# Patient Record
Sex: FEMALE | Race: BLACK OR AFRICAN AMERICAN
[De-identification: names, ages, dates, MRNs, and addresses within clinical notes are randomized per-mention and may not be internally consistent; named-entity substitution may affect disease eponyms.]

---

## 2017-04-26 ENCOUNTER — HOSPITAL ENCOUNTER (OUTPATIENT)
Age: 27
End: 2017-04-26
Payer: COMMERCIAL

## 2017-04-26 DIAGNOSIS — Z83.2: ICD-10-CM

## 2017-04-26 DIAGNOSIS — M79.609: Primary | ICD-10-CM

## 2017-04-26 DIAGNOSIS — E55.9: ICD-10-CM

## 2017-11-14 ENCOUNTER — HOSPITAL ENCOUNTER (EMERGENCY)
Dept: HOSPITAL 76 - ED | Age: 27
Discharge: HOME | End: 2017-11-14
Payer: MEDICAID

## 2017-11-14 VITALS — DIASTOLIC BLOOD PRESSURE: 79 MMHG | SYSTOLIC BLOOD PRESSURE: 118 MMHG

## 2017-11-14 DIAGNOSIS — J45.901: Primary | ICD-10-CM

## 2017-11-14 PROCEDURE — 71020: CPT

## 2017-11-14 PROCEDURE — 94664 DEMO&/EVAL PT USE INHALER: CPT

## 2017-11-14 PROCEDURE — 99283 EMERGENCY DEPT VISIT LOW MDM: CPT

## 2017-11-14 NOTE — XRAY PRELIMINARY REPORT
Accession: [id number]

Exam: XR CHEST 2 VIEW PA/LAT

 

IMPRESSION: Normal 2-view chest radiography.

 

[facility]

 

SITE ID: [id number]

## 2017-11-14 NOTE — ED PHYSICIAN DOCUMENTATION
History of Present Illness





- Stated complaint


Stated Complaint: COUGHING





- Chief complaint


Chief Complaint: Abd Pain





- Additonal information


Additional information: 





hx from pt


27 female


denies preg


to ER with cough X 2 months


was txed for sinusitis s relief


no travle


kids int sick too


no leg pain or swelling


tried mucinex


was coughing so hard last night it made her vomit and her abd is sore today 

from coughing and she coughed up a little bit of bloody mucous





Review of Systems


Constitutional: denies: Fever, Chills


Nose: reports: Congestion


Throat: denies: Sore throat


Cardiac: denies: Chest pain / pressure


Respiratory: reports: Cough





PD PAST MEDICAL HISTORY





- Past Medical History


Past Medical History: No





- Past Surgical History


Past Surgical History: Yes


/GYN:  section





- Present Medications


Home Medications: 


 Ambulatory Orders











 Medication  Instructions  Recorded  Confirmed


 


Albuterol Sulfate [Proair Hfa 2 puffs INH Q4H PRN #1 inhaler 17 





Inhaler]   


 


Benzonatate [Tessalon] 100 mg PO TID PRN #20 capsule 17 


 


Fluticasone [Flonase] 1 sprays BROOKS BID PRN #1 bottle 17 


 


Pseudoephedrine [Sudafed] 30 mg PO Q6H PRN #20 tablet 17 


 


predniSONE [Deltasone] 60 mg PO DAILY 5 Days  tablet 17 














- Allergies


Allergies/Adverse Reactions: 


 Allergies











Allergy/AdvReac Type Severity Reaction Status Date / Time


 


No Known Drug Allergies Allergy   Verified 17 11:11














- Social History


Does the pt smoke?: No


Smoking Status: Never smoker


Does the pt drink ETOH?: Yes


Does the pt have substance abuse?: No





- POLST


Patient has POLST: No





PD ED PE NORMAL





- Vitals


Vital signs reviewed: Yes





- General


General: Alert and oriented X 3





- HEENT


HEENT: PERRL.  No: Ears normal (occluded cerumen), Pharynx benign (PND)





- Neck


Neck: Supple, no meningeal sign





- Cardiac


Cardiac: RRR





- Respiratory


Respiratory: No respiratory distress, Other (whezing on left)





- Abdomen


Abdomen: Soft, Non tender





- Derm


Derm: Normal color





- Extremities


Extremities: No deformity, No edema, No calf tenderness / cord





- Neuro


Neuro: Alert and oriented X 3





Results





- Vitals


Vitals: 





 Vital Signs - 24 hr











  17





  11:05 13:01 14:02


 


Temperature 36.3 C L 36.9 C 36.7 C


 


Heart Rate 84 72 81


 


Respiratory 16 15 16





Rate   


 


Blood Pressure 128/71 122/79 118/79


 


O2 Saturation 100 100 97








 Oxygen











O2 Source                      Room air

















- Rads (name of study)


  ** CXR


Radiology: See rad report (no acute)





Departure





- Departure


Disposition: 01 Home, Self Care


Clinical Impression: 


Reactive airway disease


Qualifiers:


 Asthma severity: unspecified severity Asthma persistence: unspecified Asthma 

complication type: with acute exacerbation Qualified Code(s): J45.901 - 

Unspecified asthma with (acute) exacerbation





Condition: Good


Instructions:  ED Reactive Airway Disease, ED Sinusitis No Abx


Follow-Up: 


Kaylan Ragland ARNP [Primary Care Provider] - 


Prescriptions: 


Albuterol Sulfate [Proair Hfa Inhaler] 2 puffs INH Q4H PRN #1 inhaler


 PRN Reason: Shortness Of Air/Wheezing


Benzonatate [Tessalon] 100 mg PO TID PRN #20 capsule


 PRN Reason: to ease cough


Fluticasone [Flonase] 1 sprays BROOKS BID PRN #1 bottle


 PRN Reason: allergies


predniSONE [Deltasone] 60 mg PO DAILY 5 Days  tablet


Pseudoephedrine [Sudafed] 30 mg PO Q6H PRN #20 tablet


 PRN Reason: congestion


Comments: 


Try a sinus irrigation kit


Return if worse - especially if cough up large amounts of blood

## 2017-11-14 NOTE — XRAY REPORT
EXAM:

CHEST RADIOGRAPHY

 

EXAM DATE: 11/14/2017 12:13 PM.

 

CLINICAL HISTORY: Cough X 2 months.

 

COMPARISON: None.

 

TECHNIQUE: 2 views.

 

FINDINGS: 

Lungs/Pleura: No focal opacities evident. No pleural effusion. No pneumothorax. Normal volumes.

 

Mediastinum: Heart and mediastinal contours are unremarkable.

 

Other: None.

 

IMPRESSION: Normal 2-view chest radiography.

 

[facility]

Referring Provider Line: [phone]

 

SITE ID: [id number]

## 2018-01-23 ENCOUNTER — HOSPITAL ENCOUNTER (OUTPATIENT)
Dept: HOSPITAL 76 - DI | Age: 28
Discharge: HOME | End: 2018-01-23
Attending: PHYSICIAN ASSISTANT
Payer: MEDICAID

## 2018-01-23 DIAGNOSIS — M54.5: Primary | ICD-10-CM

## 2018-01-23 PROCEDURE — 72100 X-RAY EXAM L-S SPINE 2/3 VWS: CPT

## 2018-01-24 NOTE — XRAY REPORT
DATE OF SERVICE: 01/23/2018

 

THREE VIEW LUMBAR SPINE:  01/23/2018

 

CLINICAL INDICATION:  Chronic low back pain.

 

FINDINGS:  AP, lateral, coned down views of the lumbar spine demonstrate normal height and 

alignment of the lumbar vertebral bodies.  The disk spaces are preserved.  There is no evidence

of fracture or subluxation.  The bowel gas pattern is unremarkable.

 

IMPRESSION:  NORMAL LUMBAR SPINE.

 

 

 

DD: 01/23/2018 15:37

TD: 01/24/2018 10:44

Job #: [id number]

## 2018-03-06 ENCOUNTER — HOSPITAL ENCOUNTER (EMERGENCY)
Dept: HOSPITAL 76 - ED | Age: 28
Discharge: HOME | End: 2018-03-06
Payer: MEDICAID

## 2018-03-06 VITALS — DIASTOLIC BLOOD PRESSURE: 93 MMHG | SYSTOLIC BLOOD PRESSURE: 107 MMHG

## 2018-03-06 DIAGNOSIS — J02.0: Primary | ICD-10-CM

## 2018-03-06 PROCEDURE — 87430 STREP A AG IA: CPT

## 2018-03-06 PROCEDURE — 99283 EMERGENCY DEPT VISIT LOW MDM: CPT

## 2018-03-06 NOTE — ED PHYSICIAN DOCUMENTATION
PD HPI HEENT





- Stated complaint


Stated Complaint: SORE THROAT,BODY ACHES





- Chief complaint


Chief Complaint: Heent





- History obtained from


History obtained from: Patient





- History of Present Illness


Timing - onset: How many days ago


Location: Throat


Worsens: Swalllowing


Associated symptoms: Fever, Swollen nodes


Similar symptoms before: Has not had sx before


Recently seen: Not recently seen





- Additional information


Additional information: 





patient is a 27 year old female with no significant past medical history who is 

presenting to the emergency department for fever, sore throat and throat 

swelling.  patient's daughter was recently treated for strep throat.  





Review of Systems


Constitutional: reports: Fever, Chills


Eyes: reports: Reviewed and negative


Ears: reports: Ear pain


Throat: reports: Sore throat


Cardiac: denies: Chest pain / pressure, Palpitations


Respiratory: denies: Cough


GI: denies: Nausea, Vomiting


: reports: Reviewed and negative


Skin: denies: Rash, Lesions


Musculoskeletal: reports: Reviewed and negative


Neurologic: denies: Generalized weakness, Focal weakness, Headache


Immunocompromised: denies: Immunocompromised





PD PAST MEDICAL HISTORY





- Past Surgical History


Past Surgical History: Yes


/GYN:  section





- Present Medications


Home Medications: 


 Ambulatory Orders











 Medication  Instructions  Recorded  Confirmed


 


Albuterol Sulfate [Proair Hfa 2 puffs INH Q4H PRN #1 inhaler 17 





Inhaler]   


 


Benzonatate [Tessalon] 100 mg PO TID PRN #20 capsule 17 


 


Fluticasone [Flonase] 1 sprays BROOKS BID PRN #1 bottle 17 


 


Pseudoephedrine [Sudafed] 30 mg PO Q6H PRN #20 tablet 17 


 


predniSONE [Deltasone] 60 mg PO DAILY 5 Days  tablet 17 














- Allergies


Allergies/Adverse Reactions: 


 Allergies











Allergy/AdvReac Type Severity Reaction Status Date / Time


 


No Known Drug Allergies Allergy   Verified 18 03:22














- Social History


Does the pt smoke?: No


Smoking Status: Never smoker


Does the pt drink ETOH?: Yes


Does the pt have substance abuse?: No





- POLST


Patient has POLST: No





PD ED PE NORMAL





- Vitals


Vital signs reviewed: Yes





- General


General: Alert and oriented X 3, No acute distress





- HEENT


HEENT: Atraumatic, PERRL





- Neck


Neck: Supple, no meningeal sign





- Cardiac


Cardiac: RRR, No murmur





- Respiratory


Respiratory: No respiratory distress, Clear bilaterally





- Abdomen


Abdomen: Soft, Non tender, Non distended





- Derm


Derm: Normal color, Warm and dry, No rash





- Extremities


Extremities: No deformity





- Neuro


Neuro: Alert and oriented X 3, No motor deficit, No sensory deficit, Normal 

speech


Eye Opening: Spontaneous


Motor: Obeys Commands


Verbal: Oriented


GCS Score: 15





- Psych


Psych: Normal mood





PD ED PE EXPANDED





- HEENT


HEENT: R TM dull, L TM dull, Dry mucous membranes, Pharyngeal erythema, Swollen 

tonsils, Tonsillar exudate





Results





- Vitals


Vitals: 


 Vital Signs - 24 hr











  18





  03:21


 


Temperature 37.8 C H


 


Heart Rate 90


 


Respiratory 18





Rate 


 


Blood Pressure 107/93 H


 


O2 Saturation 96








 Oxygen











O2 Source                      Room air

















- Labs


Labs: 


 Laboratory Tests











  18





  03:25


 


Group A Strep Rapid  POSITIVE H














PD MEDICAL DECISION MAKING





- ED course


Complexity details: reviewed old records, reviewed results, re-evaluated patient

, considered differential, d/w patient


ED course: 





Patient was seen and examined at bedside.  rapid strep was performed.  patient 

was treated with decadron and ibuprofen.  rapid strep was positive and patient 

was treated with bicillin.  Patient required no further work up and was stable 

for discharge with outpatient follow up.     





Departure





- Departure


Disposition: 01 Home, Self Care


Clinical Impression: 


 Strep pharyngitis





Condition: Good


Instructions:  ED Strep Pharyngitis Conf


Follow-Up: 


Kaylan Ragland ARNP [Primary Care Provider] - As Needed


Comments: 


Your symptoms today are being caused by strep throat.  You had your dose of 

antibiotics so you will not need any more treatment.  You can take motrin, 

tylenol and throat lozenges as needed for pain.  You should follow up with your 

doctor if your symptoms don't improve in the next few days.  You may return to 

the emergency department at any time for new, worsening or uncontrollable 

symptoms.

## 2018-03-29 ENCOUNTER — HOSPITAL ENCOUNTER (EMERGENCY)
Dept: HOSPITAL 76 - ED | Age: 28
Discharge: HOME | End: 2018-03-29
Payer: MEDICAID

## 2018-03-29 VITALS — DIASTOLIC BLOOD PRESSURE: 81 MMHG | SYSTOLIC BLOOD PRESSURE: 121 MMHG

## 2018-03-29 DIAGNOSIS — J06.9: Primary | ICD-10-CM

## 2018-03-29 PROCEDURE — 94640 AIRWAY INHALATION TREATMENT: CPT

## 2018-03-29 PROCEDURE — 71046 X-RAY EXAM CHEST 2 VIEWS: CPT

## 2018-03-29 PROCEDURE — 99283 EMERGENCY DEPT VISIT LOW MDM: CPT

## 2018-03-29 NOTE — ED PHYSICIAN DOCUMENTATION
PD HPI URI





- Stated complaint


Stated Complaint: SOA





- Chief complaint


Chief Complaint: Resp





- History obtained from


History obtained from: Patient





- History of Present Illness


Timing - onset: How many months ago (1 month of cough and now progressing well, 

with worse cough and fevers the past few days.)


Associated symptoms: Productive cough, Chest pain.  No: Hemoptysis


Similar symptoms before: Has not had sx before


Recently seen: Not recently seen





Review of Systems


Constitutional: denies: Fever, Chills


Nose: reports: Congestion


Throat: reports: Sore throat, Swollen tonsils





PD PAST MEDICAL HISTORY





- Past Medical History


Past Medical History: No





- Past Surgical History


Past Surgical History: Yes


/GYN:  section, Tubal ligation





- Present Medications


Home Medications: 


 Ambulatory Orders











 Medication  Instructions  Recorded  Confirmed


 


Albuterol Sulfate [Proair Hfa 2 puffs INH Q4H PRN #1 inhaler 17 





Inhaler]   


 


Benzonatate [Tessalon] 100 mg PO TID PRN #20 capsule 17 


 


Fluticasone [Flonase] 1 sprays BROOKS BID PRN #1 bottle 17 


 


Pseudoephedrine [Sudafed] 30 mg PO Q6H PRN #20 tablet 17 


 


predniSONE [Deltasone] 60 mg PO DAILY 5 Days  tablet 17 


 


Albuterol Sulf [Ventolin Hfa 1 - 2 puffs INH Q4HR PRN #1 inhaler 18 





Inhaler]   


 


Benzonatate [Tessalon] 100 mg PO TID PRN #25 capsule 18 


 


Cetirizine [ZyrTEC] 10 mg PO DAILY #30 tablet 18 


 


Dexamethasone [Decadron] 4 mg PO DAILY #5 tablet 18 


 


Doxycycline Monohydrate 100 mg PO BID #14 tablet 18 














- Allergies


Allergies/Adverse Reactions: 


 Allergies











Allergy/AdvReac Type Severity Reaction Status Date / Time


 


No Known Drug Allergies Allergy   Verified 18 20:04














- Social History


Does the pt smoke?: No


Smoking Status: Never smoker


Does the pt drink ETOH?: Yes


Does the pt have substance abuse?: No





- POLST


Patient has POLST: No





PD ED PE NORMAL





- General


General: Alert and oriented X 3, No acute distress, Well developed/nourished





- HEENT


HEENT: Ears normal, Moist mucous membranes, Pharynx benign





- Neck


Neck: Supple, no meningeal sign, No adenopathy





- Cardiac


Cardiac: RRR, No murmur





- Respiratory


Respiratory: Clear bilaterally





- Back


Back: No CVA TTP





- Derm


Derm: Normal color, Warm and dry, No rash





- Extremities


Extremities: No calf tenderness / cord





- Neuro


Neuro: Alert and oriented X 3, No motor deficit, Normal speech





Results





- Vitals


Vitals: 


 Oxygen











O2 Source                      Room air

















Departure





- Departure


Disposition:  Home, Self Care


Clinical Impression: 


Upper respiratory infection


Qualifiers:


 URI type: unspecified URI Qualified Code(s): J06.9 - Acute upper respiratory 

infection, unspecified





Condition: Stable


Record reviewed to determine appropriate education?: Yes


Instructions:  ED Bronchitis Asthmatic


Follow-Up: 


Kaylan Ragland ARNP [Primary Care Provider] - 


Prescriptions: 


Albuterol Sulf [Ventolin Hfa Inhaler] 1 - 2 puffs INH Q4HR PRN #1 inhaler


 PRN Reason: Shortness Of Air/Wheezing


Benzonatate [Tessalon] 100 mg PO TID PRN #25 capsule


 PRN Reason: Cough


Cetirizine [ZyrTEC] 10 mg PO DAILY #30 tablet


Dexamethasone [Decadron] 4 mg PO DAILY #5 tablet


Doxycycline Monohydrate 100 mg PO BID #14 tablet


Comments: 


Drink lots of fluids.  This sounds like he may be developing a secondary 

bronchitis type infection and so use the doxycycline antibiotic twice daily for 

a week.  There is likely some allergy or mild asthma type component and so use 

albuterol inhaler 2 puffs 4 times a day for the next 7-10 days.  Decadron 

steroid anti-inflammatory daily for 5 more days.  He can use Tessalon if needed 

for cough.  Use cetirizine antihistamine daily for the next month to see if the 

phlegm decreases even after you are feeling better.  Recheck if not improving 

though over the next few days.


Discharge Date/Time: 18 22:42

## 2018-03-29 NOTE — XRAY REPORT
EXAM:

CHEST RADIOGRAPHY

 

EXAM DATE: 3/29/2018 08:33 PM.

 

CLINICAL HISTORY: Cough.

 

COMPARISON: 11/14/2017.

 

TECHNIQUE: 2 views.

 

FINDINGS: 

Lungs/Pleura: No focal opacities evident. No pleural effusion. No pneumothorax. Normal volumes.

 

Mediastinum: Heart and mediastinal contours are unremarkable.

 

Other: None.

 

IMPRESSION: No acute cardiopulmonary abnormality.

 

[facility]

Referring Provider Line: [phone]

 

SITE ID: [id number]

## 2018-03-29 NOTE — XRAY PRELIMINARY REPORT
Accession: [id number]

Exam: XR CHEST 2 VIEW X-RAY

 

IMPRESSION: No acute cardiopulmonary abnormality.

 

[facility]

 

SITE ID: [id number]

## 2018-07-07 ENCOUNTER — HOSPITAL ENCOUNTER (EMERGENCY)
Dept: HOSPITAL 76 - ED | Age: 28
LOS: 1 days | Discharge: HOME | End: 2018-07-08
Payer: MEDICAID

## 2018-07-07 VITALS — DIASTOLIC BLOOD PRESSURE: 76 MMHG | SYSTOLIC BLOOD PRESSURE: 126 MMHG

## 2018-07-07 DIAGNOSIS — M25.531: Primary | ICD-10-CM

## 2018-07-07 PROCEDURE — 99283 EMERGENCY DEPT VISIT LOW MDM: CPT

## 2018-07-07 PROCEDURE — 73110 X-RAY EXAM OF WRIST: CPT

## 2018-07-07 PROCEDURE — 73100 X-RAY EXAM OF WRIST: CPT

## 2018-07-08 NOTE — ED PHYSICIAN DOCUMENTATION
History of Present Illness





- Stated complaint


Stated Complaint: RT HAND PAIN





- Chief complaint


Chief Complaint: Ext Problem





- History obtained from


History obtained from: Patient





- Additonal information


Additional information: 





27-year-old female presents to the emergency department with right wrist pain.  

The patient's pain has progressively worsened over the past 3-4 days and 

radiates into her hand and upper arm.  Symptoms are described as moderate.  

Mild improvement with ibuprofen.  The patient has had similar symptoms in the 

past which were attributed to carpal tunnel but the patient has not had a 

formal workup.  The patient denies recent injury.  The patient does use her 

hands on a regular basis for work and does repetitive motion.  No other area of 

discomfort.  





Review of Systems


Constitutional: denies: Fever


Eyes: denies: Discharge


Skin: denies: Lesions


Musculoskeletal: reports: Extremity pain.  denies: Neck pain, Back pain


Neurologic: denies: Focal weakness, Numbness


Immunocompromised: denies: Immunocompromised





PD PAST MEDICAL HISTORY





- Past Surgical History


Past Surgical History: Yes


/GYN:  section, Tubal ligation





- Present Medications


Home Medications: 


 Ambulatory Orders











 Medication  Instructions  Recorded  Confirmed


 


Albuterol Sulfate [Proair Hfa 2 puffs INH Q4H PRN #1 inhaler 17 





Inhaler]   


 


Benzonatate [Tessalon] 100 mg PO TID PRN #20 capsule 17 


 


Fluticasone [Flonase] 1 sprays BROOKS BID PRN #1 bottle 17 


 


Pseudoephedrine [Sudafed] 30 mg PO Q6H PRN #20 tablet 17 


 


predniSONE [Deltasone] 60 mg PO DAILY 5 Days  tablet 17 


 


Albuterol Sulf [Ventolin Hfa 1 - 2 puffs INH Q4HR PRN #1 inhaler 18 





Inhaler]   


 


Benzonatate [Tessalon] 100 mg PO TID PRN #25 capsule 18 


 


Cetirizine [ZyrTEC] 10 mg PO DAILY #30 tablet 18 


 


Dexamethasone [Decadron] 4 mg PO DAILY #5 tablet 18 


 


Doxycycline Monohydrate 100 mg PO BID #14 tablet 18 


 


Naproxen [Naprosyn] 500 mg PO BID PRN 30 Days #30 18 





 tablet  














- Allergies


Allergies/Adverse Reactions: 


 Allergies











Allergy/AdvReac Type Severity Reaction Status Date / Time


 


No Known Drug Allergies Allergy   Verified 18 23:19














- Social History


Does the pt smoke?: No


Smoking Status: Never smoker


Does the pt drink ETOH?: Yes


Does the pt have substance abuse?: No





- POLST


Patient has POLST: No





PD ED PE NORMAL





- General


General: Alert and oriented X 3, No acute distress





- HEENT


HEENT: Atraumatic, PERRL, EOMI





- Neck


Neck: Supple, no meningeal sign





- Respiratory


Respiratory: No respiratory distress





- Derm


Derm: Normal color





- Extremities


Extremities: No deformity, No edema, Other (The patient has full active range 

of motion of bilateral shoulders, elbows, wrists and hands.  The patient has 

normal motor function.  The patient has normal median, ulnar and radial nerve 

function.  There is normal radial pulses.  The patient has normal sensation to 

light touch.  There is no evidence of swelling.  No evidence of compartment 

syndrome.  Brisk cap refill bilaterally.)





- Neuro


Neuro: Alert and oriented X 3, No motor deficit, No sensory deficit, Normal 

speech





- Psych


Psych: Normal mood





Results





- Vitals


Vitals: 


 Vital Signs - 24 hr











  18





  23:16


 


Temperature 36.4 C L


 


Heart Rate 78


 


Respiratory 18





Rate 


 


Blood Pressure 126/76


 


O2 Saturation 100








 Oxygen











O2 Source                      Room air

















- Rads (name of study)


  ** No standard instances


Radiology: See rad report, Other (Right wrist x-ray: No acute fracture)





PD MEDICAL DECISION MAKING





- ED course


Complexity details: other (The patient's symptoms may be secondary to a 

tendinitis or carpal tunnel.  The patient currently appears appropriate for 

discharge home and ongoing outpatient management.  I discussed warning signs 

and recommended returning to the emergency department immediately for worsening 

or any concerns.)





- Sepsis Event


Vital Signs: 


 Vital Signs - 24 hr











  18





  23:16


 


Temperature 36.4 C L


 


Heart Rate 78


 


Respiratory 18





Rate 


 


Blood Pressure 126/76


 


O2 Saturation 100








 Oxygen











O2 Source                      Room air

















Departure





- Departure


Disposition: 01 Home, Self Care


Clinical Impression: 


Wrist pain, acute


Qualifiers:


 Laterality: unspecified laterality Qualified Code(s): M25.539 - Pain in 

unspecified wrist





Condition: Good


Instructions:  ANTI-INFLAMMATORY, General


Follow-Up: 


Kaylan Ragland ARNP [Primary Care Provider] - Within 1 week (These follow

-up with your primary care physician so that they can further workup and 

evaluate your symptoms)


Prescriptions: 


Naproxen [Naprosyn] 500 mg PO BID PRN 30 Days #30 tablet


 PRN Reason: Pain


Comments: 


Please return to the emergency department for worsening symptoms or any concerns


Discharge Date/Time: 18 00:53

## 2018-07-08 NOTE — XRAY REPORT
Procedure Date:  07/08/2018   

Accession Number:  [id number] / [id number]                    

Procedure:  XR  - Wrist 3 View RT CPT Code:  

 

FULL RESULT:

 

 

EXAM:

RIGHT WRIST RADIOGRAPHY

 

EXAM DATE: 7/8/2018 12:25 AM.

 

CLINICAL HISTORY: Pain.

 

COMPARISON: None.

 

TECHNIQUE: 3 views.

 

FINDINGS:

Bones: Normal. No fractures or bone lesions.

 

Joints: Normal. No subluxations.

 

Soft Tissues: Unremarkable.

IMPRESSION: Normal wrist radiography.

 

[facility]

## 2018-12-10 ENCOUNTER — HOSPITAL ENCOUNTER (EMERGENCY)
Dept: HOSPITAL 76 - ED | Age: 28
Discharge: HOME | End: 2018-12-10
Payer: MEDICAID

## 2018-12-10 VITALS — DIASTOLIC BLOOD PRESSURE: 81 MMHG | SYSTOLIC BLOOD PRESSURE: 121 MMHG

## 2018-12-10 DIAGNOSIS — R10.2: Primary | ICD-10-CM

## 2018-12-10 DIAGNOSIS — N76.0: ICD-10-CM

## 2018-12-10 LAB
CLARITY UR REFRACT.AUTO: CLEAR
GLUCOSE UR QL STRIP.AUTO: NEGATIVE MG/DL
HCG UR QL: NEGATIVE
KETONES UR QL STRIP.AUTO: NEGATIVE MG/DL
NITRITE UR QL STRIP.AUTO: NEGATIVE
PH UR STRIP.AUTO: 6 PH (ref 5–7.5)
PROT UR STRIP.AUTO-MCNC: NEGATIVE MG/DL
RBC # UR STRIP.AUTO: (no result) /UL
RBC # URNS HPF: (no result) /HPF (ref 0–5)
SP GR UR STRIP.AUTO: 1.02 (ref 1–1.03)
SP GR UR STRIP.AUTO: 1.02 (ref 1–1.03)
SQUAMOUS URNS QL MICRO: (no result)
UROBILINOGEN UR QL STRIP.AUTO: (no result) E.U./DL
UROBILINOGEN UR STRIP.AUTO-MCNC: NEGATIVE MG/DL

## 2018-12-10 PROCEDURE — 81001 URINALYSIS AUTO W/SCOPE: CPT

## 2018-12-10 PROCEDURE — 81003 URINALYSIS AUTO W/O SCOPE: CPT

## 2018-12-10 PROCEDURE — 87591 N.GONORRHOEAE DNA AMP PROB: CPT

## 2018-12-10 PROCEDURE — 87086 URINE CULTURE/COLONY COUNT: CPT

## 2018-12-10 PROCEDURE — 96372 THER/PROPH/DIAG INJ SC/IM: CPT

## 2018-12-10 PROCEDURE — 87210 SMEAR WET MOUNT SALINE/INK: CPT

## 2018-12-10 PROCEDURE — 99283 EMERGENCY DEPT VISIT LOW MDM: CPT

## 2018-12-10 PROCEDURE — 87491 CHLMYD TRACH DNA AMP PROBE: CPT

## 2018-12-10 PROCEDURE — 81025 URINE PREGNANCY TEST: CPT

## 2018-12-10 NOTE — ED PHYSICIAN DOCUMENTATION
PD HPI FEMALE 





- Stated complaint


Stated Complaint: ABD PX





- Chief complaint


Chief Complaint: Abd Pain





- History obtained from


History obtained from: Patient





- History of Present Illness


Timing - onset: Yesterday


Timing - details: Still present


Associated symptoms: Pelvic pain


OB-GYN History: Prior C section, Tubal ligation


Similar symptoms before: Has not had sx before





- Additional information


Additional information: 


The patient is a 28-year-old female who presents with right sided pelvic pain 

that started yesterday and became worse this morning.  She reports urgency of 

urination, and nausea.  She denies vomiting, fever, or vaginal bleeding.  Her 

last menstrual period was 4 weeks ago.  She denies history of similar symptoms 

in the past.  Past surgical history is significant for C-sections 3 and for 

bilateral tubal ligation.








Review of Systems


Constitutional: denies: Fever


Nose: denies: Congestion


Throat: denies: Sore throat


Cardiac: denies: Chest pain / pressure


Respiratory: denies: Dyspnea, Cough


GI: reports: Nausea.  denies: Abdominal Pain, Vomiting


: reports: LMP (4 weeks ago.), Other (Urgency of urination.).  denies: 

Dysuria, Discharge, Vaginal bleeding


Skin: denies: Rash


Musculoskeletal: denies: Back pain


Neurologic: denies: Headache





PD PAST MEDICAL HISTORY





- Past Medical History


Endocrine/Autoimmune: None





- Past Surgical History


Past Surgical History: Yes


/GYN:  section, Tubal ligation





- Present Medications


Home Medications: 


                                Ambulatory Orders











 Medication  Instructions  Recorded  Confirmed


 


Hydrocodone/Acetaminophen 1 each PO Q6HR PRN #10 tablet 12/10/18 





[Hydrocodon-Acetaminophn ]   


 


Metronidazole [Flagyl] 500 mg PO BID #20 tablet 12/10/18 














- Allergies


Allergies/Adverse Reactions: 


                                    Allergies











Allergy/AdvReac Type Severity Reaction Status Date / Time


 


No Known Drug Allergies Allergy   Verified 12/10/18 01:47














- Social History


Does the pt smoke?: No


Smoking Status: Never smoker


Does the pt drink ETOH?: Yes


Does the pt have substance abuse?: No





- POLST


Patient has POLST: No





PD ED PE NORMAL





- Vitals


Vital signs reviewed: Yes (normal)





- General


General: Alert and oriented X 3, Well developed/nourished





- HEENT


HEENT: Atraumatic, Pharynx benign





- Neck


Neck: No adenopathy





- Cardiac


Cardiac: RRR, No murmur





- Respiratory


Respiratory: No respiratory distress, Clear bilaterally





- Abdomen


Abdomen: Normal bowel sounds, Soft, Other (Tenderness to palpation in the right 

adnexal region.)





- Female 


Female : Chaperone present, Other (Whitish vaginal discharge; no vaginal 

bleeding; retroverted uterus; tenderness to palpation in the right adnexa, 

without rebound, no mass palpated.)





- Back


Back: No CVA TTP





- Derm


Derm: No rash





- Extremities


Extremities: No tenderness to palpate





- Neuro


Neuro: Alert and oriented X 3, No motor deficit, Normal speech





PD ED PE EXPANDED





- Female 


Female : Normal external, Vaginal Discharge, Adnexal Tenderness (On the 

right.), Cultures sent, Chaperone present, Other (Retroverted uterus.).  No: 

Vaginal Bleeding, CMT, Adnexal Mass





Results





- Vitals


Vitals: 


                                     Oxygen











O2 Source                      Room air

















- Labs


Labs: 


                                  Microbiology











 12/10/18 03:28 Wet Prep - Final





 Vaginal 








                                Laboratory Tests











  12/10/18 12/10/18





  01:55 02:10


 


Urine Color  YELLOW 


 


Urine Clarity  CLEAR 


 


Urine pH  6.0 


 


Ur Specific Gravity  1.025  1.025


 


Urine Protein  NEGATIVE 


 


Urine Glucose (UA)  NEGATIVE 


 


Urine Ketones  NEGATIVE 


 


Urine Occult Blood  SMALL H 


 


Urine Nitrite  NEGATIVE 


 


Urine Bilirubin  NEGATIVE 


 


Urine Urobilinogen  0.2 (NORMAL) 


 


Ur Leukocyte Esterase  NEGATIVE 


 


Urine RBC  0-5 


 


Urine WBC  0-3 


 


Ur Squamous Epith Cells  MANY Squamous H 


 


Urine Bacteria  None Seen 


 


Ur Microscopic Review  INDICATED 


 


Urine Culture Comments  NOT INDICATED 


 


Urine HCG, Qual   NEGATIVE














PD MEDICAL DECISION MAKING





- ED course


Complexity details: reviewed results, re-evaluated patient, considered 

differential, d/w patient


ED course: 


The patient's presentation is significant for right-sided pelvic pain that is 

likely due to an ovarian cyst.  Her pregnancy test is negative making ectopic 

pregnancy extremely unlikely.  Appendicitis was considered, but her symptoms are

 otherwise not suggestive of appendicitis.  Urinalysis is negative for urinary 

tract infection.  PID was considered, and GC included cultures are pending.  Wet

 mount is positive for clue cells, consistent with bacterial vaginosis.


Treatment in the emergency department included administration of ketorolac 60 mg

 IM, which nearly completely relieved the patient's symptoms.  On repeat 

examination her tenderness is minimal.  She is being discharged with 

prescription for metronidazole, and for Vicodin, 10 tablets.  I discussed with 

her the workup and diagnosis, outpatient treatment and follow-up, as well as 

potentially worrisome signs or symptoms that should prompt reevaluation in the 

emergency department.








Departure





- Departure


Disposition: 01 Home, Self Care


Clinical Impression: 


 Pelvic pain, Bacterial vaginosis





Condition: Stable


Instructions:  ED Vaginosis Bacterial


Follow-Up: 


Kaylan Ragland ARNP [Primary Care Provider] - 


Prescriptions: 


Hydrocodone/Acetaminophen [Hydrocodon-Acetaminophn ] 1 each PO Q6HR PRN 

#10 tablet


 PRN Reason: Pain


Metronidazole [Flagyl] 500 mg PO BID #20 tablet


Comments: 


Take metronidazole (Flagyl), twice daily as prescribed.


You can use ibuprofen, up to 800 mg 3 times daily if needed for pain.


You can also use Vicodin as prescribed if needed for pain.


Follow-up with your primary physician or your gynecologist within 1-2 weeks.  

Call to schedule an appointment.


Return to the emergency department if you develop increasing pain, fever with 

shaking chills, persistent vomiting, or otherwise worsening symptoms.


Discharge Date/Time: 12/10/18 04:13